# Patient Record
Sex: FEMALE | Race: WHITE | NOT HISPANIC OR LATINO | Employment: FULL TIME | URBAN - NONMETROPOLITAN AREA
[De-identification: names, ages, dates, MRNs, and addresses within clinical notes are randomized per-mention and may not be internally consistent; named-entity substitution may affect disease eponyms.]

---

## 2017-06-09 ENCOUNTER — HOSPITAL ENCOUNTER (EMERGENCY)
Facility: HOSPITAL | Age: 42
Discharge: HOME | End: 2017-06-09
Attending: EMERGENCY MEDICINE | Admitting: EMERGENCY MEDICINE

## 2017-06-09 ENCOUNTER — APPOINTMENT (OUTPATIENT)
Dept: GENERAL RADIOLOGY | Facility: HOSPITAL | Age: 42
End: 2017-06-09

## 2017-06-09 VITALS
BODY MASS INDEX: 21.37 KG/M2 | TEMPERATURE: 98.1 F | HEIGHT: 68 IN | RESPIRATION RATE: 18 BRPM | HEART RATE: 64 BPM | WEIGHT: 141 LBS | DIASTOLIC BLOOD PRESSURE: 92 MMHG | OXYGEN SATURATION: 99 % | SYSTOLIC BLOOD PRESSURE: 141 MMHG

## 2017-06-09 DIAGNOSIS — R07.9 CHEST PAIN OF UNCERTAIN ETIOLOGY: Primary | ICD-10-CM

## 2017-06-09 LAB
ALBUMIN SERPL-MCNC: 4.1 G/DL (ref 3.5–5)
ALBUMIN/GLOB SERPL: 1.7 G/DL (ref 1–2)
ALP SERPL-CCNC: 48 U/L (ref 38–126)
ALT SERPL W P-5'-P-CCNC: 57 U/L (ref 13–69)
ANION GAP SERPL CALCULATED.3IONS-SCNC: 13.7 MMOL/L
AST SERPL-CCNC: 43 U/L (ref 15–46)
BASOPHILS # BLD AUTO: 0.03 10*3/MM3 (ref 0–0.2)
BASOPHILS NFR BLD AUTO: 0.5 % (ref 0–2.5)
BILIRUB SERPL-MCNC: 0.5 MG/DL (ref 0.2–1.3)
BUN BLD-MCNC: 14 MG/DL (ref 7–20)
BUN/CREAT SERPL: 17.5 (ref 7.1–23.5)
CALCIUM SPEC-SCNC: 9 MG/DL (ref 8.4–10.2)
CHLORIDE SERPL-SCNC: 107 MMOL/L (ref 98–107)
CO2 SERPL-SCNC: 27 MMOL/L (ref 26–30)
CREAT BLD-MCNC: 0.8 MG/DL (ref 0.6–1.3)
DEPRECATED RDW RBC AUTO: 47.9 FL (ref 37–54)
EOSINOPHIL # BLD AUTO: 0.09 10*3/MM3 (ref 0–0.7)
EOSINOPHIL NFR BLD AUTO: 1.5 % (ref 0–7)
ERYTHROCYTE [DISTWIDTH] IN BLOOD BY AUTOMATED COUNT: 14 % (ref 11.5–14.5)
GFR SERPL CREATININE-BSD FRML MDRD: 79 ML/MIN/1.73
GFR SERPL CREATININE-BSD FRML MDRD: 96 ML/MIN/1.73
GLOBULIN UR ELPH-MCNC: 2.4 GM/DL
GLUCOSE BLD-MCNC: 96 MG/DL (ref 74–98)
HCT VFR BLD AUTO: 42.9 % (ref 37–47)
HGB BLD-MCNC: 14.1 G/DL (ref 12–16)
HOLD SPECIMEN: NORMAL
HOLD SPECIMEN: NORMAL
IMM GRANULOCYTES # BLD: 0.02 10*3/MM3 (ref 0–0.06)
IMM GRANULOCYTES NFR BLD: 0.3 % (ref 0–0.6)
LIPASE SERPL-CCNC: 316 U/L (ref 23–300)
LYMPHOCYTES # BLD AUTO: 1.46 10*3/MM3 (ref 0.6–3.4)
LYMPHOCYTES NFR BLD AUTO: 24.6 % (ref 10–50)
MCH RBC QN AUTO: 30.7 PG (ref 27–31)
MCHC RBC AUTO-ENTMCNC: 32.9 G/DL (ref 30–37)
MCV RBC AUTO: 93.3 FL (ref 81–99)
MONOCYTES # BLD AUTO: 0.52 10*3/MM3 (ref 0–0.9)
MONOCYTES NFR BLD AUTO: 8.8 % (ref 0–12)
NEUTROPHILS # BLD AUTO: 3.81 10*3/MM3 (ref 2–6.9)
NEUTROPHILS NFR BLD AUTO: 64.3 % (ref 37–80)
NRBC BLD MANUAL-RTO: 0 /100 WBC (ref 0–0)
PLATELET # BLD AUTO: 262 10*3/MM3 (ref 130–400)
PMV BLD AUTO: 10 FL (ref 6–12)
POTASSIUM BLD-SCNC: 3.7 MMOL/L (ref 3.5–5.1)
PROT SERPL-MCNC: 6.5 G/DL (ref 6.3–8.2)
RBC # BLD AUTO: 4.6 10*6/MM3 (ref 4.2–5.4)
SODIUM BLD-SCNC: 144 MMOL/L (ref 137–145)
TROPONIN I SERPL-MCNC: 0 NG/ML (ref 0–0.05)
TROPONIN I SERPL-MCNC: <0.012 NG/ML (ref 0–0.03)
TROPONIN I SERPL-MCNC: <0.012 NG/ML (ref 0–0.03)
WBC NRBC COR # BLD: 5.93 10*3/MM3 (ref 4.8–10.8)
WHOLE BLOOD HOLD SPECIMEN: NORMAL
WHOLE BLOOD HOLD SPECIMEN: NORMAL

## 2017-06-09 PROCEDURE — 93005 ELECTROCARDIOGRAM TRACING: CPT | Performed by: EMERGENCY MEDICINE

## 2017-06-09 PROCEDURE — 84484 ASSAY OF TROPONIN QUANT: CPT | Performed by: PHYSICIAN ASSISTANT

## 2017-06-09 PROCEDURE — 85025 COMPLETE CBC W/AUTO DIFF WBC: CPT | Performed by: EMERGENCY MEDICINE

## 2017-06-09 PROCEDURE — 80053 COMPREHEN METABOLIC PANEL: CPT | Performed by: EMERGENCY MEDICINE

## 2017-06-09 PROCEDURE — 71010 HC CHEST PA OR AP: CPT

## 2017-06-09 PROCEDURE — 99284 EMERGENCY DEPT VISIT MOD MDM: CPT

## 2017-06-09 PROCEDURE — 84484 ASSAY OF TROPONIN QUANT: CPT | Performed by: EMERGENCY MEDICINE

## 2017-06-09 PROCEDURE — 83690 ASSAY OF LIPASE: CPT | Performed by: PHYSICIAN ASSISTANT

## 2017-06-09 RX ORDER — ASPIRIN 325 MG
325 TABLET ORAL ONCE
Status: COMPLETED | OUTPATIENT
Start: 2017-06-09 | End: 2017-06-09

## 2017-06-09 RX ORDER — SODIUM CHLORIDE 0.9 % (FLUSH) 0.9 %
10 SYRINGE (ML) INJECTION AS NEEDED
Status: DISCONTINUED | OUTPATIENT
Start: 2017-06-09 | End: 2017-06-10 | Stop reason: HOSPADM

## 2017-06-09 RX ORDER — NITROGLYCERIN 0.4 MG/1
0.4 TABLET SUBLINGUAL ONCE
Status: COMPLETED | OUTPATIENT
Start: 2017-06-09 | End: 2017-06-09

## 2017-06-09 RX ORDER — ATENOLOL 50 MG/1
25 TABLET ORAL DAILY
Qty: 30 TABLET | Refills: 0 | Status: SHIPPED | OUTPATIENT
Start: 2017-06-09 | End: 2017-06-20 | Stop reason: DRUGHIGH

## 2017-06-09 RX ADMIN — NITROGLYCERIN 0.4 MG: 0.4 TABLET SUBLINGUAL at 16:27

## 2017-06-09 RX ADMIN — ASPIRIN 325 MG ORAL TABLET 325 MG: 325 PILL ORAL at 15:13

## 2017-06-09 NOTE — ED PROVIDER NOTES
"Subjective   HPI Comments: 42-year-old female here today with complaints of chest pain.    She has no history of cardiac disease, MI, CHF, rheumatic fever, congenital heart defect or known murmur.  She complains of a \"sharp and aching\" pain over the anterior chest which she first noticed 2 days ago.  The pain that occurred 2 days ago only lasted a few seconds and resolved.  She was at work when this occurred.  Later that night she said she had 4 or 5 more episodes of the same type chest pain but it lasted 30 seconds up to a minute and resolved.  There was no radiation of pain.  Yesterday she said she had the same pains \"all day\" although they only lasted 30 seconds or so.  She said this happened 6-8 times yesterday.  The episodes occurred at rest.  She's also had some aching of the left shoulder all the way down to the elbow and this is been constant since last night i.e. greater than 12 hours.  She denies any redness or swelling of the left arm.  No recent chest or extremity injury.  Today, she's had multiple episodes of this fleeting chest pain however, none here.  She said she's had some chills yesterday but no fever no cough no sputum hemoptysis.  She denies any recent injury.  Several of her family members-her mom and dad both had history of MIs in their early 50s.  The patient is not treated for diabetes, hypertension, hyperlipidemia and she said she's never smoked or used  illicit drugs.  She's had no recent unilateral leg swelling and denies any known history of DVT or PE.  She has no high-risk factors for either      Review of Systems   Constitutional: Positive for chills. Negative for diaphoresis, fatigue and fever.   HENT: Negative.    Eyes: Negative.    Respiratory: Negative.  Negative for cough.    Gastrointestinal: Negative.    Endocrine: Negative.    Genitourinary: Negative.    Musculoskeletal: Negative.    Allergic/Immunologic: Negative.  Negative for immunocompromised state.   Neurological: " Negative.    Hematological: Negative.  Does not bruise/bleed easily.   Psychiatric/Behavioral: Negative.    All other systems reviewed and are negative.      Past Medical History:   Diagnosis Date   • Disease of thyroid gland        Allergies   Allergen Reactions   • Reglan [Metoclopramide] Other (See Comments)     Seizures         Past Surgical History:   Procedure Laterality Date   • HYSTERECTOMY         History reviewed. No pertinent family history.    Social History     Social History   • Marital status:      Spouse name: N/A   • Number of children: N/A   • Years of education: N/A     Social History Main Topics   • Smoking status: Never Smoker   • Smokeless tobacco: None   • Alcohol use No   • Drug use: No   • Sexual activity: Not Asked     Other Topics Concern   • None     Social History Narrative   • None           Objective   Physical Exam   Constitutional: She is oriented to person, place, and time. She appears well-developed and well-nourished. No distress.   HENT:   Head: Normocephalic and atraumatic.   Right Ear: External ear normal.   Left Ear: External ear normal.   Mouth/Throat: Oropharynx is clear and moist. No oropharyngeal exudate.   Eyes: EOM are normal. Right eye exhibits no discharge. Left eye exhibits no discharge. No scleral icterus.   Neck: Neck supple. No JVD present.   Cardiovascular: Normal rate, regular rhythm, normal heart sounds and intact distal pulses.    No murmur heard.  Pulmonary/Chest: Effort normal and breath sounds normal. No respiratory distress. She has no wheezes. She has no rales. She exhibits no tenderness.   She has no chest wall tenderness   Abdominal: Soft. Bowel sounds are normal. She exhibits no distension and no mass. There is no tenderness. There is no rebound and no guarding. No hernia.   Musculoskeletal: Normal range of motion. She exhibits no edema, tenderness or deformity.   Neurological: She is alert and oriented to person, place, and time. No cranial  nerve deficit. She exhibits normal muscle tone.   Skin: Skin is warm and dry. No rash noted. She is not diaphoretic. No erythema. No pallor.   Psychiatric: She has a normal mood and affect. Her behavior is normal. Thought content normal.       Procedures         ED Course  ED Course   Comment By Time   The patient's EKG here on arrival shows normal sinus rhythm 66 bpm no acute injury pattern, ischemia or QT abnormality.  She was given aspirin per protocol.  Her pain does not sound typical for anginal type discomfort.  She does have family history of CAD but no other risk factors.  She is not high risk for PE or DVT and she has no recent URI symptoms or trauma.  She should have an elevated troponin given the length of time of the left arm discomfort if this were to be an anginal equivalent.  Cause the pain does radiate to the shoulder of also obtained a lipase as well. Moe Claudio PA-C 06/09 1616   Emergency patient was having chest pain and this was resolved with a single nitroglycerin.  She said the nitroglycerin did not help her arm pain.  First troponin is negative have obtained second one to be drawn about 7 PM. Moe Claudio PA-C 06/09 8072   Patient presented here with several days of fleeting chest pain and no cardiac risk factors for CAD except a strong family history.  EKG here showed no acute changeS 2 sets of cardiac enzymes have returned normal.  Her CBC and CMP are normal and her vital signs have been essentially normal the entire ED stay as well.  We did try a nitroglycerin earlier and she did obtain some relief but nothing with her arm discomfort.  She was given aspirin here as well.  I spoke to the cardiologist on-call and he agreed that follow-up in the office first of the week would be reasonable given that she has ruled out and only has the family history at this time.  Cardiologist did recommend low-dose of beta blocker and a baby aspirin daily.  I reviewed these instructions with the  patient thoroughly and she said she would return over the weekend if her symptoms change worsens or new symptoms develop. Moe Claudio PA-C 06/09 2107                  [facility]    Final diagnoses:   Chest pain of uncertain etiology            Moe Claudio PA-C  06/09/17 2107

## 2017-06-20 ENCOUNTER — CONSULT (OUTPATIENT)
Dept: CARDIOLOGY | Facility: CLINIC | Age: 42
End: 2017-06-20

## 2017-06-20 VITALS
HEIGHT: 68 IN | HEART RATE: 60 BPM | DIASTOLIC BLOOD PRESSURE: 88 MMHG | SYSTOLIC BLOOD PRESSURE: 144 MMHG | WEIGHT: 141.2 LBS | OXYGEN SATURATION: 99 % | BODY MASS INDEX: 21.4 KG/M2 | RESPIRATION RATE: 16 BRPM

## 2017-06-20 DIAGNOSIS — R06.02 SHORTNESS OF BREATH: ICD-10-CM

## 2017-06-20 DIAGNOSIS — R00.2 PALPITATIONS: ICD-10-CM

## 2017-06-20 DIAGNOSIS — I10 ESSENTIAL HYPERTENSION: ICD-10-CM

## 2017-06-20 DIAGNOSIS — R07.2 PRECORDIAL PAIN: Primary | ICD-10-CM

## 2017-06-20 PROCEDURE — 99204 OFFICE O/P NEW MOD 45 MIN: CPT | Performed by: INTERNAL MEDICINE

## 2017-06-20 RX ORDER — ESTRADIOL 1 MG/1
1 TABLET ORAL DAILY
COMMUNITY
End: 2021-09-01 | Stop reason: SDUPTHER

## 2017-06-20 RX ORDER — LEVOTHYROXINE SODIUM 88 UG/1
88 TABLET ORAL DAILY
COMMUNITY

## 2017-06-20 RX ORDER — ATENOLOL 25 MG/1
25 TABLET ORAL DAILY
Qty: 30 TABLET | Refills: 11 | Status: SHIPPED | OUTPATIENT
Start: 2017-06-20 | End: 2017-09-19 | Stop reason: ALTCHOICE

## 2017-06-20 RX ORDER — ASPIRIN 81 MG/1
81 TABLET ORAL DAILY
COMMUNITY
End: 2023-01-05

## 2017-06-20 NOTE — PROGRESS NOTES
Subjective:     Encounter Date:06/20/2017      Patient ID: Nathalie Bee is a 42 y.o. female.    Chief Complaint:Chest pain, shortness of breath, palpitations, edema, dizziness, fatigue and hypertension  HPI  This is a 42-year-old female patient who presents to cardiology clinic with a two-week history of a multitude of cardiac complaints.  The patient indicates that she has been having a central sternal discomfort that she describes as both a burning pain and a stabbing pain.  She feels that the stabbing component is more frequent of the two.  The discomfort occasionally radiates to her left arm.  The discomfort has approximately 6 - 7 / 10 .  The discomfort tends to occur on a daily basis and generally occurs at rest.  She cannot identify any precipitating aggravating or alleviating features.  The discomfort will generally lasts for less than one to 2 seconds if it is stabbing in nature but will typically last 3-4 minutes if it has a burning quality.  There is some associated shortness of breath but no nausea vomiting or diaphoresis.  The frequency duration and intensity has gradually worsened over the last 2 weeks.  She was seen in the [facility] emergency room on June 9 of this year where her 12-lead electrocardiogram showed no ischemic ST-T wave changes or injury current.  Her troponins were serially negative.  At that time the patient had a severely elevated blood pressure which was 168/101.  The patient was started on atenolol which has improved her blood pressure however she is now experiencing symptomatic bradycardia with heart rates in the upper 40s to lower 50s.  The patient indicates that since her pulse has been decreased and since starting the atenolol she has felt increased fatigue, lack of energy and a sense of exhaustion after doing physical activities.  She reports having no stamina.  The patient also reports having shortness of breath both at rest and with activity.  There is no orthopnea or  PND.  She reports having some swelling in her feet and ankles particularly if she's been on her feet for prolonged periods of time.  The patient also reports having palpitations for the last 2 weeks.  She describes these as being a sense that her heart is racing.  This occurs on a daily basis.  Will typically last 5-10 minutes.  She cannot identify any precipitating aggravating or alleviating features.  It occasionally is associated with chest discomfort but more frequently occurs in isolation.  There is some associated dizziness but no syncope.  The patient has no personal history of myocardial infarction or coronary revascularization.  She reports having a nuclear stress test in 2012 in [facility] but was unaware of the results.  She has never had cardiac catheterization or a stent placed.  She has a history of hypertension which has been somewhat labile.  She has no history of diabetes or hypercholesterolemia.  She is a nonsmoker.  Her family history is strongly positive for premature coronary disease.  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  The following portions of the patient's history were reviewed and updated as appropriate: allergies, current medications, past family history, past medical history, past social history, past surgical history and problem  Review of Systems   Constitution: Positive for weakness and malaise/fatigue. Negative for chills, diaphoresis, fever, weight gain and weight loss.   HENT: Negative for ear discharge, hearing loss, hoarse voice and nosebleeds.    Eyes: Negative for discharge, double vision, pain and photophobia.   Cardiovascular: Positive for chest pain, dyspnea on exertion, irregular heartbeat, leg swelling and palpitations. Negative for claudication, cyanosis, near-syncope, orthopnea, paroxysmal nocturnal dyspnea and syncope.    Respiratory: Positive for shortness of breath. Negative for cough, hemoptysis, sputum production and wheezing.    Endocrine: Negative for cold intolerance, heat intolerance, polydipsia, polyphagia and polyuria.   Hematologic/Lymphatic: Negative for adenopathy and bleeding problem. Does not bruise/bleed easily.   Skin: Negative for color change, flushing, itching and rash.   Musculoskeletal: Negative for muscle cramps, muscle weakness, myalgias and stiffness.   Gastrointestinal: Negative for abdominal pain, diarrhea, hematemesis, hematochezia, nausea and vomiting.   Genitourinary: Negative for dysuria, frequency and nocturia.   Neurological: Positive for dizziness. Negative for focal weakness, loss of balance, numbness, paresthesias and seizures.   Psychiatric/Behavioral: Negative for altered mental status, hallucinations and suicidal ideas.   Allergic/Immunologic: Negative for HIV exposure, hives and persistent infections.       Current Outpatient Prescriptions:   •  aspirin 81 MG EC tablet, Take 81 mg by mouth Daily., Disp: , Rfl:   •  estradiol (ESTRACE) 1 MG tablet, Take 1 mg by mouth Daily., Disp: , Rfl:   •  levothyroxine (SYNTHROID, LEVOTHROID) 88 MCG tablet, Take 88 mcg by mouth Daily., Disp: , Rfl:   •  atenolol (TENORMIN) 25 MG tablet, Take 1 tablet by mouth Daily., Disp: 30 tablet, Rfl: 11     Objective:     Physical Exam   Constitutional: She is oriented to person, place, and time. She appears well-developed and well-nourished.   HENT:   Head: Normocephalic and atraumatic.   Mouth/Throat: Oropharynx is clear and moist.   Eyes: Conjunctivae and EOM are normal. Pupils are equal, round, and reactive to light. No scleral icterus.   Neck: Normal range of motion. Neck supple. No JVD present. No tracheal deviation present. No thyromegaly present.   Cardiovascular: Normal rate, regular rhythm, S1 normal, S2 normal, normal heart sounds, intact distal pulses and normal pulses.  PMI is not displaced.  Exam reveals  "no gallop and no friction rub.    No murmur heard.  Pulmonary/Chest: Effort normal and breath sounds normal. No respiratory distress. She has no wheezes. She has no rales.   Abdominal: Soft. Bowel sounds are normal. She exhibits no distension and no mass. There is no tenderness. There is no rebound and no guarding.   Musculoskeletal: Normal range of motion. She exhibits no edema or deformity.   Neurological: She is alert and oriented to person, place, and time. She displays normal reflexes. No cranial nerve deficit. Coordination normal.   Skin: Skin is warm and dry. No rash noted. No erythema.   Psychiatric: She has a normal mood and affect. Her behavior is normal. Thought content normal.     Blood pressure 144/88, pulse 60, resp. rate 16, height 68\" (172.7 cm), weight 141 lb 3.2 oz (64 kg), SpO2 99 %.   Lab Review:       Assessment:         1. Precordial pain  The patient's chest discomfort is mostly atypical for coronary insufficiency.  She does have multiple risk factors for coronary artery disease.  She has not had an ischemic evaluation in over 5 years.  She indicates that she is unable to exercise for treadmill exercise stress test purposes.  Stress testing would be somewhat difficult given her iatrogenic bradycardia and would require interruption of her beta blocker therapy.  - Stress Test With Myocardial Perfusion One Day  - Adult Transthoracic Echo Complete    2. Palpitations  The patient's symptoms could be related to underlying arrhythmia and/or ectopy.  - Adult Transthoracic Echo Complete  - Holter Monitor - 48 Hour    3. Essential hypertension  Her blood pressure appears to be improved since starting beta blockade however she is having significant beta blocker related side effects which has resulted in iatrogenic sinus bradycardia.  - Adult Transthoracic Echo Complete    4. Shortness of breath  I suspect this is multifactorial in etiology.  Some could very well be related to her hypertension and " subsequent hypertensive related cardiac disease with diastolic dysfunction.  The patient may have unrecognized congestive heart failure.  This could also represent an ischemic equivalent.  - Adult Transthoracic Echo Complete  Procedures     Plan:       I recommended a vasodilator nuclear stress test as well as an echocardiogram.  We will obtain a Holter monitor.  I have recommended decreasing her atenolol to 25 mg once per day.  Further recommendations will be predicated on the results of her outpatient testing.

## 2017-06-28 LAB
BH CV ECHO MEAS - % IVS THICK: 29.2 %
BH CV ECHO MEAS - % LVPW THICK: 33.3 %
BH CV ECHO MEAS - AO MAX PG (FULL): 2.3 MMHG
BH CV ECHO MEAS - AO MAX PG: 5.5 MMHG
BH CV ECHO MEAS - AO MEAN PG (FULL): 1 MMHG
BH CV ECHO MEAS - AO MEAN PG: 3 MMHG
BH CV ECHO MEAS - AO ROOT AREA (BSA CORRECTED): 1.2
BH CV ECHO MEAS - AO ROOT AREA: 3.8 CM^2
BH CV ECHO MEAS - AO ROOT DIAM: 2.2 CM
BH CV ECHO MEAS - AO V2 MAX: 117 CM/SEC
BH CV ECHO MEAS - AO V2 MEAN: 81.6 CM/SEC
BH CV ECHO MEAS - AO V2 VTI: 28.2 CM
BH CV ECHO MEAS - AVA(I,A): 2.4 CM^2
BH CV ECHO MEAS - AVA(I,D): 2.4 CM^2
BH CV ECHO MEAS - AVA(V,A): 2.4 CM^2
BH CV ECHO MEAS - AVA(V,D): 2.4 CM^2
BH CV ECHO MEAS - BSA(HAYCOCK): 1.8 M^2
BH CV ECHO MEAS - BSA: 1.8 M^2
BH CV ECHO MEAS - BZI_BMI: 21.4 KILOGRAMS/M^2
BH CV ECHO MEAS - BZI_METRIC_HEIGHT: 172.7 CM
BH CV ECHO MEAS - BZI_METRIC_WEIGHT: 64 KG
BH CV ECHO MEAS - CONTRAST EF 4CH: 63.2 ML/M^2
BH CV ECHO MEAS - EDV(CUBED): 91.1 ML
BH CV ECHO MEAS - EDV(MOD-SP4): 114 ML
BH CV ECHO MEAS - EDV(TEICH): 92.4 ML
BH CV ECHO MEAS - EF(CUBED): 74.6 %
BH CV ECHO MEAS - EF(MOD-SP4): 63.2 %
BH CV ECHO MEAS - EF(TEICH): 66.6 %
BH CV ECHO MEAS - ESV(CUBED): 23.1 ML
BH CV ECHO MEAS - ESV(MOD-SP4): 42 ML
BH CV ECHO MEAS - ESV(TEICH): 30.9 ML
BH CV ECHO MEAS - FS: 36.7 %
BH CV ECHO MEAS - IVS/LVPW: 1.6
BH CV ECHO MEAS - IVSD: 1 CM
BH CV ECHO MEAS - IVSS: 1.6 CM
BH CV ECHO MEAS - LA DIMENSION: 3.3 CM
BH CV ECHO MEAS - LA/AO: 1.5
BH CV ECHO MEAS - LV DIASTOLIC VOL/BSA (35-75): 64.7 ML/M^2
BH CV ECHO MEAS - LV MASS(C)D: 148 GRAMS
BH CV ECHO MEAS - LV MASS(C)DI: 84 GRAMS/M^2
BH CV ECHO MEAS - LV MASS(C)S: 112.4 GRAMS
BH CV ECHO MEAS - LV MASS(C)SI: 63.8 GRAMS/M^2
BH CV ECHO MEAS - LV MAX PG: 3.2 MMHG
BH CV ECHO MEAS - LV MEAN PG: 2 MMHG
BH CV ECHO MEAS - LV SYSTOLIC VOL/BSA (12-30): 23.8 ML/M^2
BH CV ECHO MEAS - LV V1 MAX: 89.8 CM/SEC
BH CV ECHO MEAS - LV V1 MEAN: 59.6 CM/SEC
BH CV ECHO MEAS - LV V1 VTI: 21.9 CM
BH CV ECHO MEAS - LVIDD: 4.5 CM
BH CV ECHO MEAS - LVIDS: 2.9 CM
BH CV ECHO MEAS - LVLD AP4: 8.3 CM
BH CV ECHO MEAS - LVLS AP4: 7 CM
BH CV ECHO MEAS - LVOT AREA (M): 3.1 CM^2
BH CV ECHO MEAS - LVOT AREA: 3.1 CM^2
BH CV ECHO MEAS - LVOT DIAM: 2 CM
BH CV ECHO MEAS - LVPWD: 0.75 CM
BH CV ECHO MEAS - LVPWS: 1 CM
BH CV ECHO MEAS - MV A MAX VEL: 76.3 CM/SEC
BH CV ECHO MEAS - MV DEC SLOPE: 534 CM/SEC^2
BH CV ECHO MEAS - MV DEC TIME: 0.18 SEC
BH CV ECHO MEAS - MV E MAX VEL: 90.8 CM/SEC
BH CV ECHO MEAS - MV E/A: 1.2
BH CV ECHO MEAS - MV P1/2T MAX VEL: 93.5 CM/SEC
BH CV ECHO MEAS - MV P1/2T: 51.3 MSEC
BH CV ECHO MEAS - MVA P1/2T LCG: 2.4 CM^2
BH CV ECHO MEAS - MVA(P1/2T): 4.3 CM^2
BH CV ECHO MEAS - PA MAX PG: 2.4 MMHG
BH CV ECHO MEAS - PA V2 MAX: 77 CM/SEC
BH CV ECHO MEAS - RAP SYSTOLE: 10 MMHG
BH CV ECHO MEAS - RVSP: 19 MMHG
BH CV ECHO MEAS - SI(AO): 60.8 ML/M^2
BH CV ECHO MEAS - SI(CUBED): 38.6 ML/M^2
BH CV ECHO MEAS - SI(LVOT): 39.1 ML/M^2
BH CV ECHO MEAS - SI(MOD-SP4): 40.9 ML/M^2
BH CV ECHO MEAS - SI(TEICH): 35 ML/M^2
BH CV ECHO MEAS - SV(AO): 107.2 ML
BH CV ECHO MEAS - SV(CUBED): 68 ML
BH CV ECHO MEAS - SV(LVOT): 68.8 ML
BH CV ECHO MEAS - SV(MOD-SP4): 72 ML
BH CV ECHO MEAS - SV(TEICH): 61.6 ML
BH CV ECHO MEAS - TR MAX VEL: 149.5 CM/SEC
BH CV ECHO MEAS - TV MAX PG: 1.1 MMHG
BH CV ECHO MEAS - TV V2 MAX: 52.3 CM/SEC
LEFT ATRIUM VOLUME INDEX: 22 ML/M2
LV EF 2D ECHO EST: 64 %

## 2017-07-06 ENCOUNTER — HOSPITAL ENCOUNTER (OUTPATIENT)
Dept: GENERAL RADIOLOGY | Facility: HOSPITAL | Age: 42
Discharge: HOME | End: 2017-07-06
Attending: INTERNAL MEDICINE

## 2017-07-06 ENCOUNTER — HOSPITAL ENCOUNTER (OUTPATIENT)
Dept: NUCLEAR MEDICINE | Facility: HOSPITAL | Age: 42
Discharge: HOME | End: 2017-07-06
Attending: INTERNAL MEDICINE

## 2017-07-06 ENCOUNTER — APPOINTMENT (OUTPATIENT)
Dept: NUCLEAR MEDICINE | Facility: HOSPITAL | Age: 42
End: 2017-07-06
Attending: INTERNAL MEDICINE

## 2017-07-06 ENCOUNTER — APPOINTMENT (OUTPATIENT)
Dept: GENERAL RADIOLOGY | Facility: HOSPITAL | Age: 42
End: 2017-07-06
Attending: INTERNAL MEDICINE

## 2017-07-06 LAB
BH CV STRESS RECOVERY BP: NORMAL MMHG
BH CV STRESS RECOVERY HR: 67 BPM
LV EF NUC BP: 69 %
MAXIMAL PREDICTED HEART RATE: 180 BPM
PERCENT MAX PREDICTED HR: 43.89 %
STRESS BASELINE BP: NORMAL MMHG
STRESS BASELINE HR: 56 BPM
STRESS PERCENT HR: 52 %
STRESS POST PEAK BP: NORMAL MMHG
STRESS POST PEAK HR: 79 BPM
STRESS TARGET HR: 153 BPM

## 2017-07-06 PROCEDURE — 78452 HT MUSCLE IMAGE SPECT MULT: CPT

## 2017-07-06 PROCEDURE — A9500 TC99M SESTAMIBI: HCPCS | Performed by: INTERNAL MEDICINE

## 2017-07-06 PROCEDURE — 0 TECHNETIUM SESTAMIBI: Performed by: INTERNAL MEDICINE

## 2017-07-06 PROCEDURE — 78452 HT MUSCLE IMAGE SPECT MULT: CPT | Performed by: INTERNAL MEDICINE

## 2017-07-06 PROCEDURE — 93018 CV STRESS TEST I&R ONLY: CPT | Performed by: INTERNAL MEDICINE

## 2017-07-06 PROCEDURE — 25010000002 REGADENOSON 0.4 MG/5ML SOLUTION: Performed by: INTERNAL MEDICINE

## 2017-07-06 PROCEDURE — 93017 CV STRESS TEST TRACING ONLY: CPT

## 2017-07-06 RX ADMIN — Medication 1 DOSE: at 09:00

## 2017-07-06 RX ADMIN — REGADENOSON 0.4 MG: 0.08 INJECTION, SOLUTION INTRAVENOUS at 09:00

## 2017-07-06 RX ADMIN — Medication 1 DOSE: at 06:50

## 2017-09-19 ENCOUNTER — OFFICE VISIT (OUTPATIENT)
Dept: SURGERY | Facility: CLINIC | Age: 42
End: 2017-09-19

## 2017-09-19 VITALS
RESPIRATION RATE: 16 BRPM | BODY MASS INDEX: 21.22 KG/M2 | TEMPERATURE: 98.2 F | HEIGHT: 68 IN | WEIGHT: 140 LBS | SYSTOLIC BLOOD PRESSURE: 122 MMHG | HEART RATE: 72 BPM | DIASTOLIC BLOOD PRESSURE: 98 MMHG | OXYGEN SATURATION: 99 %

## 2017-09-19 DIAGNOSIS — K62.5 RECTAL BLEED: Primary | ICD-10-CM

## 2017-09-19 PROCEDURE — 99204 OFFICE O/P NEW MOD 45 MIN: CPT | Performed by: SURGERY

## 2017-09-19 RX ORDER — IBUPROFEN 800 MG/1
TABLET ORAL
Refills: 2 | COMMUNITY
Start: 2017-08-24

## 2017-09-19 NOTE — PROGRESS NOTES
"Patient: Nathalie Bee    YOB: 1975    Date: 09/19/2017    Primary Care Provider: FRANCISCO Reyes    Reason for Consultation: Colonoscopy    Chief complaint:   Chief Complaint   Patient presents with   • Rectal Bleeding       Subjective .     History of present illness:  I saw the patient in the office today as a consultation for evaluation and treatment of rectal pain with bouts of bright red blood per rectum which has been ongoing for @ \" couple of years,\" she stated that she had a colonoscopy performed in 2012 and it showed \"irritation in the colon and hemorrhoids\" which were banded at the time of endoscopy, she also complains of bouts of diarrhea, with occasional dark colored stool.Also passes clots at times.  Symptoms have worsened as of late.  Occasionally has some rectal pain after bowel movements as well.  She denies any constipation and takes a fiber supplement daily.    Review of Systems   Constitutional: Negative for chills, fever and unexpected weight change.   HENT: Negative for hearing loss, trouble swallowing and voice change.    Eyes: Negative for visual disturbance.   Respiratory: Negative for apnea, cough, chest tightness, shortness of breath and wheezing.    Cardiovascular: Negative for chest pain, palpitations and leg swelling.   Gastrointestinal: Positive for anal bleeding, blood in stool and rectal pain. Negative for abdominal distention, abdominal pain, constipation, diarrhea, nausea and vomiting.   Endocrine: Negative for cold intolerance and heat intolerance.   Genitourinary: Negative for difficulty urinating, dysuria and flank pain.   Musculoskeletal: Negative for back pain and gait problem.   Skin: Negative for color change, rash and wound.   Neurological: Negative for dizziness, syncope, speech difficulty, weakness, light-headedness, numbness and headaches.   Hematological: Negative for adenopathy. Does not bruise/bleed easily.   Psychiatric/Behavioral: Negative for " confusion. The patient is not nervous/anxious.        History:  Past Medical History:   Diagnosis Date   • Disease of thyroid gland    • GERD (gastroesophageal reflux disease)    • Hyperlipidemia    • Hypertension    • Hypothyroidism    • Kidney infection    • Migraines    • Ovarian cyst        Past Surgical History:   Procedure Laterality Date   • CHOLECYSTECTOMY  2009   • HYSTERECTOMY  2010   • TONSILLECTOMY AND ADENOIDECTOMY  1982   • TUBAL ABDOMINAL LIGATION  2008       Family History   Problem Relation Age of Onset   • Stroke Mother    • Heart attack Mother    • Hypertension Mother    • Kidney disease Mother    • Heart attack Father    • Cancer Maternal Aunt    • Cancer Paternal Aunt    • Cancer Maternal Grandmother    • Cancer Cousin    • Cancer Maternal Aunt    • Cancer Maternal Aunt        Social History   Substance Use Topics   • Smoking status: Never Smoker   • Smokeless tobacco: Never Used   • Alcohol use No       Allergies:  Allergies   Allergen Reactions   • Reglan [Metoclopramide] Other (See Comments)     Seizures         Medications:    Current Outpatient Prescriptions:   •  aspirin 81 MG EC tablet, Take 81 mg by mouth Daily., Disp: , Rfl:   •  estradiol (ESTRACE) 1 MG tablet, Take 1 mg by mouth Daily., Disp: , Rfl:   •  ibuprofen (ADVIL,MOTRIN) 800 MG tablet, TAKE 1 TABLET BY MOUTH 2-3 TIMES A DAY AS NEEDED, Disp: , Rfl: 2  •  levothyroxine (SYNTHROID, LEVOTHROID) 88 MCG tablet, Take 88 mcg by mouth Daily., Disp: , Rfl:     Objective     Vital Signs:   Temp:  [98.2 °F (36.8 °C)] 98.2 °F (36.8 °C)  Heart Rate:  [72] 72  Resp:  [16] 16  BP: (122)/(98) 122/98    Physical Exam:   General Appearance:    Alert, cooperative, in no acute distress   Head:    Normocephalic, without obvious abnormality, atraumatic   Eyes:            Lids and lashes normal, conjunctivae and sclerae normal, no   icterus, no pallor, corneas clear, PERRL   Ears:    Ears appear intact with no abnormalities noted   Lungs:     Clear  to auscultation,respirations regular, even and                  unlabored    Heart:    Regular rhythm and normal rate, normal S1 and S2, no            murmur   Abdomen:     no masses, no organomegaly, soft non-tender, non-distended, no guarding, there is no evidence of tenderness   Extremities:   Moves all extremities well, no edema, no cyanosis, no             redness   Pulses:   Pulses palpable and equal bilaterally   Skin:   No bleeding, bruising or rash   Neurologic:   Cranial nerves 2 - 12 grossly intact, sensation intact   Rectal exam: No significant abnormalities noted.       Results Review:   None    Review of Systems was reviewed and confirmed as accurate today.    Assessment/Plan :    1. Rectal bleed        I recommend a colonoscopy for further evaluation. The procedure was explained as well as the risks which include but are not limited to bleeding, infection, perforation, abdominal pain etc. The patient understands these risks and the procedure and wishes to proceed. Also if indicated I would proceed with hemorrhoid banding and she understands this as well as the risks of bleeding and the risk of infection associated with that procedure.    Electronically signed by Carlos Gayle MD  09/19/17 10:58 AM

## 2017-10-04 ENCOUNTER — OUTSIDE FACILITY SERVICE (OUTPATIENT)
Dept: SURGERY | Facility: CLINIC | Age: 42
End: 2017-10-04

## 2017-10-04 PROCEDURE — 45398 COLONOSCOPY W/BAND LIGATION: CPT | Performed by: SURGERY

## 2017-10-04 PROCEDURE — 45380 COLONOSCOPY AND BIOPSY: CPT | Performed by: SURGERY

## 2017-10-26 ENCOUNTER — OFFICE VISIT (OUTPATIENT)
Dept: SURGERY | Facility: CLINIC | Age: 42
End: 2017-10-26

## 2017-10-26 VITALS
BODY MASS INDEX: 21.22 KG/M2 | SYSTOLIC BLOOD PRESSURE: 122 MMHG | DIASTOLIC BLOOD PRESSURE: 98 MMHG | OXYGEN SATURATION: 99 % | HEIGHT: 68 IN | WEIGHT: 140 LBS | HEART RATE: 72 BPM | TEMPERATURE: 98.5 F

## 2017-10-26 DIAGNOSIS — K64.1 GRADE II HEMORRHOIDS: Primary | ICD-10-CM

## 2017-10-26 PROCEDURE — 99213 OFFICE O/P EST LOW 20 MIN: CPT | Performed by: SURGERY

## 2017-10-26 NOTE — PROGRESS NOTES
Patient: Nathalie Bee    YOB: 1975    Date: 10/26/2017    Primary Care Provider: FRANCISCO Reyes    Chief Complaint:   Chief Complaint   Patient presents with   • Follow-up     follow up colonoscopy       History: I saw the patient in the office today to follow up on her recent colonoscopy done on 10/04/17, pathology report did show fragments of benign colonic mucosa. She states she has done well but complains of rectal bleeding. He also complains of prolapse of hemorrhoids that sometimes she has to manually reduce but usually reduce by themselves.  The bleeding is daily.  She has no straining with her bowel movements.  She also has to wear a pad because of the bleeding.    Review of Systems   Constitutional: Negative for chills, fever and unexpected weight change.   HENT: Negative for hearing loss, trouble swallowing and voice change.    Eyes: Negative for visual disturbance.   Respiratory: Negative for apnea, cough, chest tightness, shortness of breath and wheezing.    Cardiovascular: Negative for chest pain, palpitations and leg swelling.   Gastrointestinal: Positive for anal bleeding. Negative for abdominal distention, abdominal pain, blood in stool, constipation, diarrhea, nausea, rectal pain and vomiting.   Endocrine: Negative for cold intolerance and heat intolerance.   Genitourinary: Negative for difficulty urinating, dysuria and flank pain.   Musculoskeletal: Negative for back pain and gait problem.   Skin: Negative for color change, rash and wound.   Neurological: Negative for dizziness, syncope, speech difficulty, weakness, light-headedness, numbness and headaches.   Hematological: Negative for adenopathy. Does not bruise/bleed easily.   Psychiatric/Behavioral: Negative for confusion. The patient is not nervous/anxious.      Medical History        Past Medical History:   Diagnosis Date   • Disease of thyroid gland     • GERD (gastroesophageal reflux disease)     • Hyperlipidemia     •  "Hypertension     • Hypothyroidism     • Kidney infection     • Migraines     • Ovarian cyst               Surgical History          Past Surgical History:   Procedure Laterality Date   • CHOLECYSTECTOMY   2009   • HYSTERECTOMY   2010   • TONSILLECTOMY AND ADENOIDECTOMY   1982   • TUBAL ABDOMINAL LIGATION   2008                  Family History   Problem Relation Age of Onset   • Stroke Mother     • Heart attack Mother     • Hypertension Mother     • Kidney disease Mother     • Heart attack Father     • Cancer Maternal Aunt     • Cancer Paternal Aunt     • Cancer Maternal Grandmother     • Cancer Cousin     • Cancer Maternal Aunt     • Cancer Maternal Aunt                Social History   Substance Use Topics   • Smoking status: Never Smoker   • Smokeless tobacco: Never Used   • Alcohol use No         Allergies:        Allergies   Allergen Reactions   • Reglan [Metoclopramide] Other (See Comments)       Seizures          Vital Signs  /98  Pulse 72  Temp 98.5 °F (36.9 °C) (Temporal Artery )   Ht 68\" (172.7 cm)  Wt 140 lb (63.5 kg)  SpO2 99%  BMI 21.29 kg/m2    Allergies:  Allergies   Allergen Reactions   • Reglan [Metoclopramide] Other (See Comments)     Seizures         Medications:    Current Outpatient Prescriptions:   •  aspirin 81 MG EC tablet, Take 81 mg by mouth Daily., Disp: , Rfl:   •  estradiol (ESTRACE) 1 MG tablet, Take 1 mg by mouth Daily., Disp: , Rfl:   •  ibuprofen (ADVIL,MOTRIN) 800 MG tablet, TAKE 1 TABLET BY MOUTH 2-3 TIMES A DAY AS NEEDED, Disp: , Rfl: 2  •  levothyroxine (SYNTHROID, LEVOTHROID) 88 MCG tablet, Take 88 mcg by mouth Daily., Disp: , Rfl:     Physical Exam:   General Appearance:    Alert, cooperative, in no acute distress   Head:    Normocephalic, without obvious abnormality, atraumatic   Lungs:     Clear to auscultation,respirations regular, even and                  unlabored    Heart:    Regular rhythm and normal rate, normal S1 and S2, no            murmur, no gallop, no " rub, no click   Abdomen:     Normal bowel sounds, no masses, no organomegaly, soft        non-tender, non-distended, no guarding, no rebound                tenderness   Extremities:   Moves all extremities well, no edema, no cyanosis, no             redness   Pulses:   Pulses palpable and equal bilaterally   Skin:   No bleeding, bruising or rash  Anal exam: Normal.  No current evidence of thrombosis or prolapse       Assessment/Plan     1. Grade II hemorrhoids :Maybe grade 3 hemorrhoids based on her history.  At this time I have offered her both medical management including fiber products and behavior Changes but at this time it appears the patient has no straining/constipation and she is having daily bleeding even passively.  Patient does not wish to pursue any other conservative measures at this time.  She wishes to go ahead with a hemorrhoidectomy.  She understands this procedure as well as the risk of bleeding, infection including severe infections, recurrence, incontinence etc. she understands these and wishes to proceed.         Electronically signed by Carlos Gayle MD  10/26/17    Scribed for Carlos Gayle MD by Mili Cespedes. 10/26/2017  2:46 PM

## 2017-11-01 ENCOUNTER — OUTSIDE FACILITY SERVICE (OUTPATIENT)
Dept: SURGERY | Facility: CLINIC | Age: 42
End: 2017-11-01

## 2017-11-01 PROCEDURE — 46221 LIGATION OF HEMORRHOID(S): CPT | Performed by: SURGERY

## 2017-11-01 PROCEDURE — 46945 INT HRHC LIG 1 HROID W/O IMG: CPT | Performed by: SURGERY

## 2017-11-16 ENCOUNTER — OFFICE VISIT (OUTPATIENT)
Dept: SURGERY | Facility: CLINIC | Age: 42
End: 2017-11-16

## 2017-11-16 VITALS
OXYGEN SATURATION: 97 % | TEMPERATURE: 98.3 F | HEART RATE: 66 BPM | WEIGHT: 139.6 LBS | DIASTOLIC BLOOD PRESSURE: 80 MMHG | SYSTOLIC BLOOD PRESSURE: 118 MMHG | BODY MASS INDEX: 21.16 KG/M2 | HEIGHT: 68 IN

## 2017-11-16 DIAGNOSIS — Z48.89 POSTOPERATIVE VISIT: Primary | ICD-10-CM

## 2017-11-16 PROCEDURE — 99024 POSTOP FOLLOW-UP VISIT: CPT | Performed by: SURGERY

## 2017-11-16 NOTE — PROGRESS NOTES
"Patient: Nathalie Bee    YOB: 1975    Date: 11/16/2017    Primary Care Provider: FRANCISCO Reyes    Chief Complaint:   Chief Complaint   Patient presents with   • Post-op     Patient is here for a post op hemorrohidectomy       History: Patient is here for a post op hemorrohidectomy.  Pathology shows squamoglandular mucosa with prominently dilated submocosal vasculature, consistent with hemorrhoid. Patient denies any pain at this time.  Patient states that she is having some bowel movements just not like before the surgery.  Patient denies any blood.  Patient states that she is currently taking a stool softener to help with her bowel movements.    Review of Systems    Vital Signs  /80  Pulse 66  Temp 98.3 °F (36.8 °C)  Ht 68\" (172.7 cm)  Wt 139 lb 9.6 oz (63.3 kg)  SpO2 97%  BMI 21.23 kg/m2    Allergies:  Allergies   Allergen Reactions   • Reglan [Metoclopramide] Other (See Comments)     Seizures         Medications:    Current Outpatient Prescriptions:   •  aspirin 81 MG EC tablet, Take 81 mg by mouth Daily., Disp: , Rfl:   •  estradiol (ESTRACE) 1 MG tablet, Take 1 mg by mouth Daily., Disp: , Rfl:   •  ibuprofen (ADVIL,MOTRIN) 800 MG tablet, TAKE 1 TABLET BY MOUTH 2-3 TIMES A DAY AS NEEDED, Disp: , Rfl: 2  •  levothyroxine (SYNTHROID, LEVOTHROID) 88 MCG tablet, Take 88 mcg by mouth Daily., Disp: , Rfl:     Physical Exam:   General Appearance:    Alert, cooperative, in no acute distress. Anal exam was performed and appears normal.  No evidence of infection.        Assessment/Plan     1. Postoperative visit :Patient doing well postoperatively after her hemorrhoidectomy having no issues.  Her bowel function will get back to normal and I explained to her both narcotics and the surgery may affect this in the short-term.  Fiber as needed.  Since she is better and not having any bleeding I'll have her follow-up with me as needed.         Electronically signed by Carlos Gayle, " MD  11/16/17   Scribed for Carlos Gayle MD by Mary Mota. 11/16/2017  1:31 PM

## 2021-07-01 ENCOUNTER — TRANSCRIBE ORDERS (OUTPATIENT)
Dept: ADMINISTRATIVE | Facility: HOSPITAL | Age: 46
End: 2021-07-01

## 2021-07-01 DIAGNOSIS — Z12.31 BREAST CANCER SCREENING BY MAMMOGRAM: Primary | ICD-10-CM

## 2021-09-01 ENCOUNTER — OFFICE VISIT (OUTPATIENT)
Dept: OBSTETRICS AND GYNECOLOGY | Facility: CLINIC | Age: 46
End: 2021-09-01

## 2021-09-01 VITALS
DIASTOLIC BLOOD PRESSURE: 64 MMHG | WEIGHT: 155 LBS | HEIGHT: 68 IN | SYSTOLIC BLOOD PRESSURE: 126 MMHG | BODY MASS INDEX: 23.49 KG/M2

## 2021-09-01 DIAGNOSIS — Z11.3 SCREENING FOR STD (SEXUALLY TRANSMITTED DISEASE): Primary | ICD-10-CM

## 2021-09-01 DIAGNOSIS — Z12.31 ENCOUNTER FOR SCREENING MAMMOGRAM FOR MALIGNANT NEOPLASM OF BREAST: ICD-10-CM

## 2021-09-01 DIAGNOSIS — N95.1 MENOPAUSAL SYMPTOMS: ICD-10-CM

## 2021-09-01 PROCEDURE — 99386 PREV VISIT NEW AGE 40-64: CPT | Performed by: OBSTETRICS & GYNECOLOGY

## 2021-09-01 RX ORDER — ESTRADIOL 1 MG/1
1.5 TABLET ORAL DAILY
Qty: 60 TABLET | Refills: 4 | Status: SHIPPED | OUTPATIENT
Start: 2021-09-01 | End: 2022-10-03 | Stop reason: SDUPTHER

## 2021-09-01 NOTE — PROGRESS NOTES
"Subjective   Chief Complaint   Patient presents with   • Gynecologic Exam     Patient c/o lumps in groin area. Last pap 2010 WNL Hysterectomy 3/20/2010. Would like to be tested for STD, possible BV or yeast infection     Nathalie Bee is a 46 y.o. year old No obstetric history on file..  No LMP recorded (lmp unknown). Patient has had a hysterectomy.  She presents to be seen because of d/c, odor and pruritus for long term. Constantly swinging back and forth between yeast and then BV.  2-3 months ago a right sided bump arose just outside of the vagina. ON the left inner side of the vaginal patient feels a lump as well this inttermitteny \"flares\"--noticed this for years.     OTHER COMPLAINTS:  Nothing else    The following portions of the patient's history were reviewed and updated as appropriate:  She  has a past medical history of Disease of thyroid gland, Endometriosis, GERD (gastroesophageal reflux disease), Hyperlipidemia, Hypertension, Hypothyroidism, Kidney infection, Migraines, and Ovarian cyst.  She does not have any pertinent problems on file.  She  has a past surgical history that includes Hysterectomy (2010); Tubal ligation (2008); Tonsillectomy and adenoidectomy (1982); Cholecystectomy (2009); and Hemorrhoid surgery.  Her family history includes Cancer in her cousin, maternal aunt, maternal aunt, maternal aunt, maternal grandmother, and paternal aunt; Heart attack in her father and mother; Hypertension in her mother; Kidney disease in her mother; Stroke in her mother.  She  reports that she has never smoked. She has never used smokeless tobacco. She reports that she does not drink alcohol and does not use drugs.  Current Outpatient Medications   Medication Sig Dispense Refill   • estradiol (ESTRACE) 1 MG tablet Take 1 mg by mouth Daily.     • ibuprofen (ADVIL,MOTRIN) 800 MG tablet TAKE 1 TABLET BY MOUTH 2-3 TIMES A DAY AS NEEDED  2   • levothyroxine (SYNTHROID, LEVOTHROID) 88 MCG tablet Take 88 mcg by mouth " "Daily.     • aspirin 81 MG EC tablet Take 81 mg by mouth Daily.       No current facility-administered medications for this visit.     Current Outpatient Medications on File Prior to Visit   Medication Sig   • estradiol (ESTRACE) 1 MG tablet Take 1 mg by mouth Daily.   • ibuprofen (ADVIL,MOTRIN) 800 MG tablet TAKE 1 TABLET BY MOUTH 2-3 TIMES A DAY AS NEEDED   • levothyroxine (SYNTHROID, LEVOTHROID) 88 MCG tablet Take 88 mcg by mouth Daily.   • aspirin 81 MG EC tablet Take 81 mg by mouth Daily.     No current facility-administered medications on file prior to visit.     She is allergic to reglan [metoclopramide].    Social History    Tobacco Use      Smoking status: Never Smoker      Smokeless tobacco: Never Used    Review of Systems  Consitutional POS: nothing reported    NEG: anorexia or night sweats   Gastointestinal POS: nothing reported    NEG: bloating, change in bowel habits, melena or reflux symptoms   Genitourinary POS: nothing reported    NEG: dysuria or hematuria   Integument POS: nothing reported    NEG: moles that are changing in size, shape, color or rashes   Breast POS: nothing reported    NEG: persistent breast lump, skin dimpling or nipple discharge         Respiratory: negative  Cardiovascular: negative          Objective   /64   Ht 172.7 cm (68\")   Wt 70.3 kg (155 lb)   LMP  (LMP Unknown)   Breastfeeding No   BMI 23.57 kg/m²     General:  well developed; well nourished  no acute distress   Skin:  No suspicious lesions seen   Thyroid: normal to inspection and palpation   Lungs:  breathing is unlabored  clear to auscultation bilaterally   Heart:  regular rate and rhythm, S1, S2 normal, no murmur, click, rub or gallop   Breasts:  Examined in supine position  Symmetric without masses or skin dimpling  Nipples normal without inversion, lesions or discharge  There are no palpable axillary nodes   Abdomen: soft, non-tender; no masses  no umbilical or inguinal hernias are present  no " hepato-splenomegaly   Pelvis: Clinical staff was present for exam  External genitalia:  normal appearance of the external genitalia including Bartholin's and [place]'s glands.  :  urethral meatus normal;  Vaginal:  normal pink mucosa without prolapse or lesions.  Cervix:  normal appearance.  Uterus:  normal size, shape and consistency.  Adnexa:  normal bimanual exam of the adnexa.  Rectal:  digital rectal exam not performed; anus visually normal appearing.     Psychiatric: Alert and oriented ×3, mood and affect appropriate  HEENT: Atraumatic, normocephalic, normal scleral icterus  Extremities: 2+ pulses bilaterally, no edema      Lab Review   No data reviewed    Imaging   No data reviewed        Assessment   1. Normal PE     Plan   1. PAP done  2. MMG   3. Increase Estradiol to 1.5mg po qday  4. STD sawbs and blood work  5. RTC of this left sdied region flares as today exam is normal    No orders of the defined types were placed in this encounter.         This note was electronically signed.      September 1, 2021

## 2021-09-02 LAB
HBV SURFACE AG SERPL QL IA: NEGATIVE
HCV AB S/CO SERPL IA: <0.1 S/CO RATIO (ref 0–0.9)
HIV 1+2 AB+HIV1 P24 AG SERPL QL IA: NON REACTIVE
RPR SER QL: NON REACTIVE

## 2021-09-02 NOTE — PROGRESS NOTES
Please let know blood work is negative/normal. Thanks [FreeTextEntry1] : pt is here to establish care.  [de-identified] : 56 yr old female with PMH of DM and chronic lower back pain presents today to establish care. pt is not complaint with DM ttt and FS monitoring. has no complains. pt endorses chronic fatigue and insomnia that most related to changing working hours between morning and night shifts in the same week.

## 2021-09-03 ENCOUNTER — PATIENT ROUNDING (BHMG ONLY) (OUTPATIENT)
Dept: OBSTETRICS AND GYNECOLOGY | Facility: CLINIC | Age: 46
End: 2021-09-03

## 2021-09-03 LAB
A VAGINAE DNA VAG QL NAA+PROBE: NORMAL SCORE
BVAB2 DNA VAG QL NAA+PROBE: NORMAL SCORE
C ALBICANS DNA VAG QL NAA+PROBE: NEGATIVE
C GLABRATA DNA VAG QL NAA+PROBE: NEGATIVE
C TRACH DNA VAG QL NAA+PROBE: NEGATIVE
MEGA1 DNA VAG QL NAA+PROBE: NORMAL SCORE
N GONORRHOEA DNA VAG QL NAA+PROBE: NEGATIVE
T VAGINALIS DNA VAG QL NAA+PROBE: NEGATIVE

## 2021-09-03 NOTE — PROGRESS NOTES
September 3, 2021    Hello, may I speak with Nathalie Rohit?    My name is Leny Carbajal    I am  with [facility]  [address], [address]  [place] [address] [phone].    Before we get started may I verify your date of birth? 1975    I am calling to officially welcome you to our practice and ask about your recent visit. Is this a good time to talk? Sure    Tell me about your visit with us. What things went well?  It went really well.  He took care of my problem that I was having.       We're always looking for ways to make our patients' experiences even better. Do you have recommendations on ways we may improve?  No    Overall were you satisfied with your first visit to our practice? Yes, everyone was very kind.       I appreciate you taking the time to speak with me today. Is there anything else I can do for you? Can you check about my mmg order.    **I checked and an order was placed.  Informed patient that centralized scheduling would be calling her**    Thank you, and have a great day.

## 2021-09-16 DIAGNOSIS — Z11.3 SCREENING FOR STD (SEXUALLY TRANSMITTED DISEASE): ICD-10-CM

## 2021-11-02 ENCOUNTER — HOSPITAL ENCOUNTER (OUTPATIENT)
Dept: MAMMOGRAPHY | Facility: HOSPITAL | Age: 46
Discharge: HOME | End: 2021-11-02
Admitting: OBSTETRICS & GYNECOLOGY

## 2021-11-02 DIAGNOSIS — Z12.31 ENCOUNTER FOR SCREENING MAMMOGRAM FOR MALIGNANT NEOPLASM OF BREAST: ICD-10-CM

## 2021-11-02 PROCEDURE — 77067 SCR MAMMO BI INCL CAD: CPT

## 2021-11-02 PROCEDURE — 77063 BREAST TOMOSYNTHESIS BI: CPT

## 2021-11-16 ENCOUNTER — OFFICE VISIT (OUTPATIENT)
Dept: OBSTETRICS AND GYNECOLOGY | Facility: CLINIC | Age: 46
End: 2021-11-16

## 2021-11-16 VITALS
BODY MASS INDEX: 24.1 KG/M2 | HEIGHT: 68 IN | SYSTOLIC BLOOD PRESSURE: 120 MMHG | WEIGHT: 159 LBS | DIASTOLIC BLOOD PRESSURE: 70 MMHG

## 2021-11-16 DIAGNOSIS — B08.1 MOLLUSCUM CONTAGIOSUM: ICD-10-CM

## 2021-11-16 DIAGNOSIS — R87.622 LGSIL PAP SMEAR OF VAGINA: Primary | ICD-10-CM

## 2021-11-16 PROCEDURE — 57455 BIOPSY OF CERVIX W/SCOPE: CPT | Performed by: OBSTETRICS & GYNECOLOGY

## 2021-11-16 PROCEDURE — 99212 OFFICE O/P EST SF 10 MIN: CPT | Performed by: OBSTETRICS & GYNECOLOGY

## 2021-11-16 RX ORDER — PROPRANOLOL HYDROCHLORIDE 10 MG/1
10 TABLET ORAL DAILY
COMMUNITY
Start: 2021-09-22 | End: 2023-01-05

## 2021-11-16 RX ORDER — ACETAMINOPHEN 500MG 500 MG/1
TABLET, COATED ORAL
COMMUNITY
Start: 2021-10-20 | End: 2023-01-05

## 2021-11-16 RX ORDER — LOSARTAN POTASSIUM 25 MG/1
25 TABLET ORAL DAILY
COMMUNITY
Start: 2021-11-05 | End: 2023-01-05

## 2021-11-16 RX ORDER — METHOCARBAMOL 750 MG/1
750 TABLET, FILM COATED ORAL 3 TIMES DAILY PRN
COMMUNITY
Start: 2021-10-20 | End: 2023-01-05

## 2021-11-16 NOTE — PROGRESS NOTES
Subjective   Chief Complaint   Patient presents with   • Procedure     Colposcopy, pap smear LSIL      Nathalie Bee is a 46 y.o. year old .  No LMP recorded (lmp unknown). Patient has had a hysterectomy.  She presents to be seen because of colposcopic evaluation after low-grade Pap smear.  Patient also complains of some small bumps that appeared on her inner thighs just lateral to the vulvar region 1 to 2 months ago.  They have not really changed or migrated.  They are asymptomatic otherwise..     OTHER COMPLAINTS:  Nothing else    The following portions of the patient's history were reviewed and updated as appropriate:  She  has a past medical history of Abnormal Pap smear of cervix (2021), Disease of thyroid gland, Endometriosis, GERD (gastroesophageal reflux disease), Hyperlipidemia, Hypertension, Hypothyroidism, Kidney infection, Migraines, and Ovarian cyst.  She does not have any pertinent problems on file.  She  has a past surgical history that includes Hysterectomy (); Tubal ligation (); Tonsillectomy and adenoidectomy (); Cholecystectomy (); and Hemorrhoid surgery.  Her family history includes Cancer in her cousin, maternal aunt, maternal aunt, maternal aunt, maternal grandmother, and paternal aunt; Heart attack in her father and mother; Hypertension in her mother; Kidney disease in her mother; Stroke in her mother.  She  reports that she has never smoked. She has never used smokeless tobacco. She reports that she does not drink alcohol and does not use drugs.  Current Outpatient Medications   Medication Sig Dispense Refill   • aspirin 81 MG EC tablet Take 81 mg by mouth Daily.     • estradiol (ESTRACE) 1 MG tablet Take 1.5 tablets by mouth Daily. 60 tablet 4   • ibuprofen (ADVIL,MOTRIN) 800 MG tablet TAKE 1 TABLET BY MOUTH 2-3 TIMES A DAY AS NEEDED  2   • levothyroxine (SYNTHROID, LEVOTHROID) 88 MCG tablet Take 88 mcg by mouth Daily.     • losartan (COZAAR) 25 MG tablet Take 25 mg  "by mouth Daily.     • methocarbamol (ROBAXIN) 750 MG tablet Take 750 mg by mouth 3 (Three) Times a Day As Needed.     • Pharbetol Extra Strength 500 MG tablet TAKE TWO TABLETS BY MOUTH EVERY 6 HOURS AS NEEDED     • propranolol (INDERAL) 10 MG tablet Take 10 mg by mouth Daily.       No current facility-administered medications for this visit.     Current Outpatient Medications on File Prior to Visit   Medication Sig   • aspirin 81 MG EC tablet Take 81 mg by mouth Daily.   • estradiol (ESTRACE) 1 MG tablet Take 1.5 tablets by mouth Daily.   • ibuprofen (ADVIL,MOTRIN) 800 MG tablet TAKE 1 TABLET BY MOUTH 2-3 TIMES A DAY AS NEEDED   • levothyroxine (SYNTHROID, LEVOTHROID) 88 MCG tablet Take 88 mcg by mouth Daily.   • losartan (COZAAR) 25 MG tablet Take 25 mg by mouth Daily.   • methocarbamol (ROBAXIN) 750 MG tablet Take 750 mg by mouth 3 (Three) Times a Day As Needed.   • Pharbetol Extra Strength 500 MG tablet TAKE TWO TABLETS BY MOUTH EVERY 6 HOURS AS NEEDED   • propranolol (INDERAL) 10 MG tablet Take 10 mg by mouth Daily.     No current facility-administered medications on file prior to visit.     She is allergic to reglan [metoclopramide].    Social History    Tobacco Use      Smoking status: Never Smoker      Smokeless tobacco: Never Used    Review of Systems  Consitutional POS: nothing reported    NEG: anorexia or night sweats   Gastointestinal POS: nothing reported    NEG: bloating, change in bowel habits, melena or reflux symptoms   Genitourinary POS: nothing reported    NEG: dysuria or hematuria   Integument POS: nothing reported    NEG: moles that are changing in size, shape, color or rashes   Breast POS: nothing reported    NEG: persistent breast lump, skin dimpling or nipple discharge         Pertinent items are noted in HPI.          Objective   /70   Ht 172.7 cm (68\")   Wt 72.1 kg (159 lb)   LMP  (LMP Unknown)   Breastfeeding No   BMI 24.18 kg/m²     General:  well developed; well nourished  no " acute distress   Skin:  Not performed.   Thyroid: not examined   Lungs:  breathing is unlabored   Heart:  Not performed.   Breasts:  Not performed.   Abdomen: Not performed.   Pelvis: Clinical staff was present for exam  External genitalia:  normal appearance of the external genitalia including Bartholin's and [place]'s glands.  :  urethral meatus normal;  Vaginal:  normal pink mucosa without prolapse or lesions.  Cervix:  absent.  Uterus:  absent.  Adnexa:  absent, bilateral.  Rectal:  digital rectal exam not performed; anus visually normal appearing.  Small bilateral macules consistent with molluscum contagiosum bilaterally.     Psychiatric: Alert and oriented ×3, mood and affect appropriate  HEENT: Atraumatic, normocephalic, normal scleral icterus  Extremities: 2+ pulses bilaterally, no edema      Lab Review   Low-grade Pap smear    Imaging   No data reviewed        Assessment   1. LGSIL  2. Molluscum contagiosum     Plan   1. Colpo done as noted below  2. Condylox 0.5% gel every other day for 3 to 4 weeks to affected area  3.     No orders of the defined types were placed in this encounter.       ..Colposcopy    Date of procedure:  11/16/2021    Risks and benefits discussed? yes  All questions answered? yes  Consents given by the patient  Written consent obtained? yes    Pre-op indication: LGSIL - mild dysplasia  Procedure documentation:    The vaginal cuff was initially viewed colposcopically.  The vaginal cuff was next bathed in acetic acid.   The findings were as follows:      .    Acetowhite noted at 5 o'clock    Biopsy taken from vaginal cuff at approximately 5 o'clock position          Colposcopic Impression: 1. Adequate colposcopy  2. Colposcopic findings are consistent with PAP       Plan: Will base further treatment on pathology results      This note was electronically signed.    Lupillo Tapia MD.        This note was electronically signed.      November 16, 2021

## 2022-10-03 RX ORDER — ESTRADIOL 1 MG/1
1.5 TABLET ORAL DAILY
Qty: 60 TABLET | Refills: 4 | Status: SHIPPED | OUTPATIENT
Start: 2022-10-03

## 2022-11-04 ENCOUNTER — OFFICE VISIT (OUTPATIENT)
Dept: OBSTETRICS AND GYNECOLOGY | Facility: CLINIC | Age: 47
End: 2022-11-04

## 2022-11-04 VITALS — WEIGHT: 155.8 LBS | BODY MASS INDEX: 23.69 KG/M2 | SYSTOLIC BLOOD PRESSURE: 140 MMHG | DIASTOLIC BLOOD PRESSURE: 80 MMHG

## 2022-11-04 DIAGNOSIS — Z12.31 ENCOUNTER FOR SCREENING MAMMOGRAM FOR MALIGNANT NEOPLASM OF BREAST: ICD-10-CM

## 2022-11-04 DIAGNOSIS — Z01.419 ENCOUNTER FOR GYNECOLOGICAL EXAMINATION WITHOUT ABNORMAL FINDING: Primary | ICD-10-CM

## 2022-11-04 PROCEDURE — 99396 PREV VISIT EST AGE 40-64: CPT | Performed by: OBSTETRICS & GYNECOLOGY

## 2022-11-04 RX ORDER — GABAPENTIN 300 MG/1
300 CAPSULE ORAL 2 TIMES DAILY
COMMUNITY
Start: 2022-10-31

## 2022-11-04 RX ORDER — MONTELUKAST SODIUM 10 MG/1
10 TABLET ORAL
COMMUNITY
Start: 2022-10-31

## 2022-11-04 RX ORDER — FLUCONAZOLE 150 MG/1
TABLET ORAL
Qty: 2 TABLET | Refills: 0 | Status: SHIPPED | OUTPATIENT
Start: 2022-11-04 | End: 2023-01-05

## 2022-11-04 NOTE — PROGRESS NOTES
Subjective   Chief Complaint   Patient presents with   • Gynecologic Exam     Yearly exam and pap smear     Nathalie Bee is a 47 y.o. year old .  No LMP recorded (lmp unknown). Patient has had a hysterectomy.  She presents to be seen because of annual exam.     OTHER COMPLAINTS:  Nothing else    The following portions of the patient's history were reviewed and updated as appropriate:  She  has a past medical history of Abnormal Pap smear of cervix (2021), Disease of thyroid gland, Endometriosis, GERD (gastroesophageal reflux disease), Hyperlipidemia, Hypertension, Hypothyroidism, Kidney infection, Migraines, and Ovarian cyst.  She does not have any pertinent problems on file.  She  has a past surgical history that includes Hysterectomy (); Tubal ligation (); Tonsillectomy and adenoidectomy (); Cholecystectomy (); and Hemorrhoid surgery.  Her family history includes Cancer in her cousin, maternal aunt, maternal aunt, maternal aunt, maternal grandmother, and paternal aunt; Heart attack in her father and mother; Hypertension in her mother; Kidney disease in her mother; Stroke in her mother.  She  reports that she has never smoked. She has never used smokeless tobacco. She reports that she does not drink alcohol and does not use drugs.  Current Outpatient Medications   Medication Sig Dispense Refill   • estradiol (ESTRACE) 1 MG tablet Take 1.5 tablets by mouth Daily. 60 tablet 4   • gabapentin (NEURONTIN) 300 MG capsule Take 1 capsule by mouth 2 (Two) Times a Day.     • ibuprofen (ADVIL,MOTRIN) 800 MG tablet TAKE 1 TABLET BY MOUTH 2-3 TIMES A DAY AS NEEDED  2   • levothyroxine (SYNTHROID, LEVOTHROID) 88 MCG tablet Take 88 mcg by mouth Daily.     • montelukast (SINGULAIR) 10 MG tablet Take 1 tablet by mouth every night at bedtime.     • aspirin 81 MG EC tablet Take 81 mg by mouth Daily.     • losartan (COZAAR) 25 MG tablet Take 25 mg by mouth Daily.     • methocarbamol (ROBAXIN) 750 MG tablet  Take 750 mg by mouth 3 (Three) Times a Day As Needed.     • Pharbetol Extra Strength 500 MG tablet TAKE TWO TABLETS BY MOUTH EVERY 6 HOURS AS NEEDED     • podofilox (CONDYLOX) 0.5 % gel BID every other day for 3-4 weeks 3.5 g 1   • propranolol (INDERAL) 10 MG tablet Take 10 mg by mouth Daily.       No current facility-administered medications for this visit.     Current Outpatient Medications on File Prior to Visit   Medication Sig   • estradiol (ESTRACE) 1 MG tablet Take 1.5 tablets by mouth Daily.   • gabapentin (NEURONTIN) 300 MG capsule Take 1 capsule by mouth 2 (Two) Times a Day.   • ibuprofen (ADVIL,MOTRIN) 800 MG tablet TAKE 1 TABLET BY MOUTH 2-3 TIMES A DAY AS NEEDED   • levothyroxine (SYNTHROID, LEVOTHROID) 88 MCG tablet Take 88 mcg by mouth Daily.   • montelukast (SINGULAIR) 10 MG tablet Take 1 tablet by mouth every night at bedtime.   • aspirin 81 MG EC tablet Take 81 mg by mouth Daily.   • losartan (COZAAR) 25 MG tablet Take 25 mg by mouth Daily.   • methocarbamol (ROBAXIN) 750 MG tablet Take 750 mg by mouth 3 (Three) Times a Day As Needed.   • Pharbetol Extra Strength 500 MG tablet TAKE TWO TABLETS BY MOUTH EVERY 6 HOURS AS NEEDED   • podofilox (CONDYLOX) 0.5 % gel BID every other day for 3-4 weeks   • propranolol (INDERAL) 10 MG tablet Take 10 mg by mouth Daily.     No current facility-administered medications on file prior to visit.     She is allergic to reglan [metoclopramide].    Social History    Tobacco Use      Smoking status: Never      Smokeless tobacco: Never    Review of Systems  Consitutional POS: nothing reported    NEG: anorexia or night sweats   Gastointestinal POS: nothing reported    NEG: bloating, change in bowel habits, melena or reflux symptoms   Genitourinary POS: nothing reported    NEG: dysuria or hematuria   Integument POS: nothing reported    NEG: moles that are changing in size, shape, color or rashes   Breast POS: nothing reported    NEG: persistent breast lump, skin dimpling  or nipple discharge         Respiratory: negative  Cardiovascular: negative          Objective   /80   Wt 70.7 kg (155 lb 12.8 oz)   LMP  (LMP Unknown)   BMI 23.69 kg/m²     General:  well developed; well nourished  no acute distress   Skin:  No suspicious lesions seen   Thyroid: normal to inspection and palpation   Lungs:  breathing is unlabored  clear to auscultation bilaterally   Heart:  regular rate and rhythm, S1, S2 normal, no murmur, click, rub or gallop   Breasts:  Examined in supine position  Symmetric without masses or skin dimpling  Nipples normal without inversion, lesions or discharge  There are no palpable axillary nodes   Abdomen: soft, non-tender; no masses  no umbilical or inguinal hernias are present  no hepato-splenomegaly   Pelvis: Clinical staff was present for exam  External genitalia:  normal appearance of the external genitalia including Bartholin's and [place]'s glands.  :  urethral meatus normal;  Vaginal:  normal pink mucosa without prolapse or lesions.  Cervix:  normal appearance.  Uterus:  normal size, shape and consistency.  Adnexa:  normal bimanual exam of the adnexa.  Rectal:  digital rectal exam not performed; anus visually normal appearing.     Psychiatric: Alert and oriented ×3, mood and affect appropriate  HEENT: Atraumatic, normocephalic, normal scleral icterus  Extremities: 2+ pulses bilaterally, no edema      Lab Review   No data reviewed    Imaging   Mammo Screening Digital Tomosynthesis Bilateral With CAD (11/02/2021 16:09)         Assessment   1. Normal PE     Plan   1. PAP done  2. MMG ordered  3. Estradiol 1mg   4. Diet/exercise    No orders of the defined types were placed in this encounter.         This note was electronically signed.      November 4, 2022

## 2022-11-10 LAB — REF LAB TEST METHOD: NORMAL

## 2022-12-12 ENCOUNTER — TELEPHONE (OUTPATIENT)
Dept: SURGERY | Facility: CLINIC | Age: 47
End: 2022-12-12
Payer: COMMERCIAL

## 2022-12-16 NOTE — TELEPHONE ENCOUNTER
PRESCREENING FOR OPEN ACCESS SCHEDULING    Nathalie Bee, 1975  [id number]    12/16/22    If, the patient answers yes to any of the following questions the provider will be informed prior to scheduling open access for approval and documented in the chart.    [x]  Yes  [] No    1. Have you ever had a colonoscopy in the past?      When:  2018      Where: [facility]       Polyps or other:     []  Yes  [x] No    2. Family history of colon cancer?      Relation:       Age of onset:       Do you currently have any of the following?    [x]  Yes  [] No  Rectal bleeding, if so, how long?     [x]  Yes  [] No  Abdominal pain, if so, how long?    [x]  Yes  [] No  Constipation, if so, how long?    [x]  Yes  [] No  Diarrhea, if so, how long?    []  Yes  [x] No  Weight loss, is so, how much?    [] Yes  [x] No  Small caliber stool, if so, how long?      Have you ever had any of the following conditions?    [] Yes  [x] No  Heart attack?      When?       Last cardiac workup?     Blood thinners?    [] Yes  [x] No   Lung problems, asthma or COPD?  [] Yes  [x] No  Oxygen required?       [] Yes  [x] No  Stroke?     [] Yes  [x] No  Have you ever had a reaction to anesthesia?         Pt scheduled appt with Dr. Gayle in office

## 2022-12-28 NOTE — PROGRESS NOTES
Patient: Nathalie Bee    YOB: 1975    Date: 01/05/2023    Primary Care Provider: Lauren Garnett APRN    Chief Complaint   Patient presents with   • Colonoscopy     Consult       SUBJECTIVE:    History of present illness: Patient with a previous history of hemorrhoid bleeding.  5 years ago underwent hemorrhoidectomy and banding.  This improved her symptoms for couple of years but over the last year or so she has had worsening bleeding mostly after bowel movements.  She has constipation alternating with diarrhea.  It has now been 5 days since her last bowel movement.  No rectal pain.    The following portions of the patient's history were reviewed and updated as appropriate: allergies, current medications, past family history, past medical history, past social history, past surgical history and problem list.    Review of Systems   Constitutional: Negative for chills, diaphoresis, fatigue and fever.   HENT: Negative for dental problem, drooling, ear discharge and hearing loss.    Respiratory: Negative for cough, choking, chest tightness and shortness of breath.    Cardiovascular: Negative for chest pain, palpitations and leg swelling.   Gastrointestinal: Positive for blood in stool.   Endocrine: Negative for cold intolerance and heat intolerance.   Genitourinary: Negative for flank pain, frequency and hematuria.   Neurological: Negative for seizures, light-headedness, numbness and headaches.   Psychiatric/Behavioral: Negative for agitation, behavioral problems and confusion.       History:  Past Medical History:   Diagnosis Date   • Abnormal Pap smear of cervix 09/01/2021    LSIL   • Disease of thyroid gland    • Endometriosis    • GERD (gastroesophageal reflux disease)    • Hyperlipidemia    • Hypertension    • Hypothyroidism    • Kidney infection    • Migraines    • Ovarian cyst        Past Surgical History:   Procedure Laterality Date   • CHOLECYSTECTOMY  2009   • HEMORRHOIDECTOMY     • HYSTERECTOMY   2010   • TONSILLECTOMY AND ADENOIDECTOMY  1982   • TUBAL ABDOMINAL LIGATION  2008       Family History   Problem Relation Age of Onset   • Stroke Mother    • Heart attack Mother    • Hypertension Mother    • Kidney disease Mother    • Heart attack Father    • Cancer Maternal Aunt    • Cancer Paternal Aunt    • Cancer Maternal Grandmother    • Cancer Cousin    • Cancer Maternal Aunt    • Cancer Maternal Aunt        Social History     Tobacco Use   • Smoking status: Never   • Smokeless tobacco: Never   Vaping Use   • Vaping Use: Never used   Substance Use Topics   • Alcohol use: No   • Drug use: No       Allergies:  Allergies   Allergen Reactions   • Reglan [Metoclopramide] Other (See Comments)     Seizures         Medications:    Current Outpatient Medications:   •  estradiol (ESTRACE) 1 MG tablet, Take 1.5 tablets by mouth Daily., Disp: 60 tablet, Rfl: 4  •  fluticasone (FLONASE) 50 MCG/ACT nasal spray, 1 spray by Each Nare route Daily., Disp: , Rfl:   •  gabapentin (NEURONTIN) 300 MG capsule, Take 1 capsule by mouth 2 (Two) Times a Day., Disp: , Rfl:   •  ibuprofen (ADVIL,MOTRIN) 800 MG tablet, TAKE 1 TABLET BY MOUTH 2-3 TIMES A DAY AS NEEDED, Disp: , Rfl: 2  •  levothyroxine (SYNTHROID, LEVOTHROID) 88 MCG tablet, Take 88 mcg by mouth Daily., Disp: , Rfl:   •  montelukast (SINGULAIR) 10 MG tablet, Take 1 tablet by mouth every night at bedtime., Disp: , Rfl:     OBJECTIVE:    Vital Signs:   Vitals:    01/05/23 1307   BP: 160/82   Pulse: 75   Resp: 16   Temp: 97.1 °F (36.2 °C)   TempSrc: Temporal   SpO2: 98%   Weight: 72.4 kg (159 lb 9.6 oz)   Height: 172.7 cm (68\")       Physical Exam:   General Appearance:    Alert, cooperative, in no acute distress   Head:    Normocephalic, without obvious abnormality, atraumatic   Eyes:            Normal.  No scleral icterus.  PERRLA    Lungs:     Clear to auscultation,respirations regular, even and                  unlabored    Heart:    Regular rhythm and normal rate, normal S1  and S2, no            murmur   Abdomen:     Normal bowel sounds, no masses, no organomegaly, soft        non-tender, non-distended, no guarding,    Extremities:   Moves all extremities well, no edema, no cyanosis, no             redness   Skin:   No bleeding, bruising or rash   Neurologic:   Normal without gross deficits.   Psychiatric: No evidence of depression or anxiety        Results Review:   None    Review of Systems was reviewed and confirmed as accurate as documented by the MA.    ASSESSMENT/PLAN:    1. Hematochezia      I recommend a fiber supplement of choice daily to avoid constipation.  I also recommend a colonoscopy with possible hemorrhoid banding.  She may need further hemorrhoid intervention depending on my findings during the colonoscopy.  I explained the procedure to the patient as well as the risks of bleeding and perforation as well as infection relating to any hemorrhoid procedure and they understand the ramifications of these potential complications and they wish to proceed.        Electronically signed by Carlos Gayle MD  01/05/23 14:34 EST

## 2023-01-05 ENCOUNTER — OFFICE VISIT (OUTPATIENT)
Dept: SURGERY | Facility: CLINIC | Age: 48
End: 2023-01-05
Payer: COMMERCIAL

## 2023-01-05 ENCOUNTER — PATIENT ROUNDING (BHMG ONLY) (OUTPATIENT)
Dept: SURGERY | Facility: CLINIC | Age: 48
End: 2023-01-05
Payer: COMMERCIAL

## 2023-01-05 VITALS
SYSTOLIC BLOOD PRESSURE: 160 MMHG | BODY MASS INDEX: 24.19 KG/M2 | RESPIRATION RATE: 16 BRPM | HEIGHT: 68 IN | WEIGHT: 159.6 LBS | HEART RATE: 75 BPM | TEMPERATURE: 97.1 F | OXYGEN SATURATION: 98 % | DIASTOLIC BLOOD PRESSURE: 82 MMHG

## 2023-01-05 DIAGNOSIS — K92.1 HEMATOCHEZIA: Primary | ICD-10-CM

## 2023-01-05 PROCEDURE — 99204 OFFICE O/P NEW MOD 45 MIN: CPT | Performed by: SURGERY

## 2023-01-05 RX ORDER — FLUTICASONE PROPIONATE 50 MCG
1 SPRAY, SUSPENSION (ML) NASAL DAILY
COMMUNITY
Start: 2022-12-30

## 2023-01-05 RX ORDER — BISACODYL 5 MG
5 TABLET, DELAYED RELEASE (ENTERIC COATED) ORAL DAILY PRN
Qty: 4 TABLET | Refills: 0 | Status: SHIPPED | OUTPATIENT
Start: 2023-01-05 | End: 2024-01-05

## 2023-01-05 RX ORDER — POLYETHYLENE GLYCOL 3350 17 G/17G
238 POWDER, FOR SOLUTION ORAL ONCE
Qty: 17 PACKET | Refills: 0 | Status: SHIPPED | OUTPATIENT
Start: 2023-01-05 | End: 2023-01-05

## 2023-01-05 NOTE — LETTER
January 5, 2023     FRANCISCO Perez  [id number]  [address]  [place] [address]    Patient: Nathalie Bee   YOB: 1975   Date of Visit: 1/5/2023       Dear FRANCISCO Conde:    Thank you for referring Nathalie Bee to me for evaluation. Below are the relevant portions of my assessment and plan of care.    If you have questions, please do not hesitate to call me. I look forward to following Nathalie along with you.         Sincerely,        Carlos Gayle MD        CC: No Recipients  Carlos Gayle MD  01/05/23 1339  Signed  Patient: Nathalie Bee    YOB: 1975    Date: 01/05/2023    Primary Care Provider: Lauren Garnett APRN    Chief Complaint   Patient presents with   • Colonoscopy     Consult       SUBJECTIVE:    History of present illness: Patient with a previous history of hemorrhoid bleeding.  5 years ago underwent hemorrhoidectomy and banding.  This improved her symptoms for couple of years but over the last year or so she has had worsening bleeding mostly after bowel movements.  She has constipation alternating with diarrhea.  It has now been 5 days since her last bowel movement.  No rectal pain.    The following portions of the patient's history were reviewed and updated as appropriate: allergies, current medications, past family history, past medical history, past social history, past surgical history and problem list.    Review of Systems   Constitutional: Negative for chills, diaphoresis, fatigue and fever.   HENT: Negative for dental problem, drooling, ear discharge and hearing loss.    Respiratory: Negative for cough, choking, chest tightness and shortness of breath.    Cardiovascular: Negative for chest pain, palpitations and leg swelling.   Gastrointestinal: Positive for blood in stool.   Endocrine: Negative for cold intolerance and heat intolerance.   Genitourinary: Negative for flank pain, frequency and hematuria.   Neurological: Negative for seizures, light-headedness,  numbness and headaches.   Psychiatric/Behavioral: Negative for agitation, behavioral problems and confusion.       History:  Past Medical History:   Diagnosis Date   • Abnormal Pap smear of cervix 09/01/2021    LSIL   • Disease of thyroid gland    • Endometriosis    • GERD (gastroesophageal reflux disease)    • Hyperlipidemia    • Hypertension    • Hypothyroidism    • Kidney infection    • Migraines    • Ovarian cyst        Past Surgical History:   Procedure Laterality Date   • CHOLECYSTECTOMY  2009   • HEMORRHOIDECTOMY     • HYSTERECTOMY  2010   • TONSILLECTOMY AND ADENOIDECTOMY  1982   • TUBAL ABDOMINAL LIGATION  2008       Family History   Problem Relation Age of Onset   • Stroke Mother    • Heart attack Mother    • Hypertension Mother    • Kidney disease Mother    • Heart attack Father    • Cancer Maternal Aunt    • Cancer Paternal Aunt    • Cancer Maternal Grandmother    • Cancer Cousin    • Cancer Maternal Aunt    • Cancer Maternal Aunt        Social History     Tobacco Use   • Smoking status: Never   • Smokeless tobacco: Never   Vaping Use   • Vaping Use: Never used   Substance Use Topics   • Alcohol use: No   • Drug use: No       Allergies:  Allergies   Allergen Reactions   • Reglan [Metoclopramide] Other (See Comments)     Seizures         Medications:    Current Outpatient Medications:   •  estradiol (ESTRACE) 1 MG tablet, Take 1.5 tablets by mouth Daily., Disp: 60 tablet, Rfl: 4  •  fluticasone (FLONASE) 50 MCG/ACT nasal spray, 1 spray by Each Nare route Daily., Disp: , Rfl:   •  gabapentin (NEURONTIN) 300 MG capsule, Take 1 capsule by mouth 2 (Two) Times a Day., Disp: , Rfl:   •  ibuprofen (ADVIL,MOTRIN) 800 MG tablet, TAKE 1 TABLET BY MOUTH 2-3 TIMES A DAY AS NEEDED, Disp: , Rfl: 2  •  levothyroxine (SYNTHROID, LEVOTHROID) 88 MCG tablet, Take 88 mcg by mouth Daily., Disp: , Rfl:   •  montelukast (SINGULAIR) 10 MG tablet, Take 1 tablet by mouth every night at bedtime., Disp: , Rfl:      OBJECTIVE:    Vital Signs:   Vitals:    01/05/23 1307   BP: 160/82   Pulse: 75   Resp: 16   Temp: 97.1 °F (36.2 °C)   TempSrc: Temporal   SpO2: 98%   Weight: 72.4 kg (159 lb 9.6 oz)   Height: 172.7 cm (68\")       Physical Exam:   General Appearance:    Alert, cooperative, in no acute distress   Head:    Normocephalic, without obvious abnormality, atraumatic   Eyes:            Normal.  No scleral icterus.  PERRLA    Lungs:     Clear to auscultation,respirations regular, even and                  unlabored    Heart:    Regular rhythm and normal rate, normal S1 and S2, no            murmur   Abdomen:     Normal bowel sounds, no masses, no organomegaly, soft        non-tender, non-distended, no guarding,    Extremities:   Moves all extremities well, no edema, no cyanosis, no             redness   Skin:   No bleeding, bruising or rash   Neurologic:   Normal without gross deficits.   Psychiatric: No evidence of depression or anxiety        Results Review:   None    Review of Systems was reviewed and confirmed as accurate as documented by the MA.    ASSESSMENT/PLAN:    1. Hematochezia      I recommend a fiber supplement of choice daily to avoid constipation.  I also recommend a colonoscopy with possible hemorrhoid banding.  She may need further hemorrhoid intervention depending on my findings during the colonoscopy.  I explained the procedure to the patient as well as the risks of bleeding and perforation as well as infection relating to any hemorrhoid procedure and they understand the ramifications of these potential complications and they wish to proceed.        Electronically signed by Carlos Gayle MD  01/05/23 14:34 EST

## 2023-01-05 NOTE — PROGRESS NOTES
January 5, 2023    Hello, may I speak with Nathalie Rohit?    My name is Karishma Mendoza    I am  with [facility] GENERAL SURGERY [address]  [facility] [address] [phone].    Before we get started may I verify your date of birth? 1975    I am calling to officially welcome you to our practice and ask about your recent visit. Is this a good time to talk? yes    Tell me about your visit with us. What things went well?  visit was good       We're always looking for ways to make our patients' experiences even better. Do you have recommendations on ways we may improve?  no    Overall were you satisfied with your first visit to our practice? yes       I appreciate you taking the time to speak with me today. Is there anything else I can do for you? no      Thank you, and have a great day.

## 2023-01-13 ENCOUNTER — TELEPHONE (OUTPATIENT)
Dept: SURGERY | Facility: CLINIC | Age: 48
End: 2023-01-13
Payer: COMMERCIAL

## 2023-01-18 ENCOUNTER — OUTSIDE FACILITY SERVICE (OUTPATIENT)
Dept: SURGERY | Facility: CLINIC | Age: 48
End: 2023-01-18
Payer: COMMERCIAL

## 2023-01-18 PROCEDURE — 45378 DIAGNOSTIC COLONOSCOPY: CPT | Performed by: SURGERY

## 2023-02-02 ENCOUNTER — OFFICE VISIT (OUTPATIENT)
Dept: SURGERY | Facility: CLINIC | Age: 48
End: 2023-02-02
Payer: COMMERCIAL

## 2023-02-02 VITALS
DIASTOLIC BLOOD PRESSURE: 90 MMHG | SYSTOLIC BLOOD PRESSURE: 140 MMHG | HEART RATE: 70 BPM | OXYGEN SATURATION: 99 % | BODY MASS INDEX: 24.25 KG/M2 | HEIGHT: 68 IN | WEIGHT: 160 LBS | TEMPERATURE: 96.6 F

## 2023-02-02 DIAGNOSIS — K64.8 INTERNAL HEMORRHOID, BLEEDING: Primary | ICD-10-CM

## 2023-02-02 PROCEDURE — 99214 OFFICE O/P EST MOD 30 MIN: CPT | Performed by: SURGERY

## 2023-09-11 RX ORDER — ESTRADIOL 1 MG/1
1.5 TABLET ORAL DAILY
Qty: 60 TABLET | Refills: 2 | Status: SHIPPED | OUTPATIENT
Start: 2023-09-11